# Patient Record
Sex: FEMALE | Race: WHITE
[De-identification: names, ages, dates, MRNs, and addresses within clinical notes are randomized per-mention and may not be internally consistent; named-entity substitution may affect disease eponyms.]

---

## 2021-03-05 ENCOUNTER — HOSPITAL ENCOUNTER (OUTPATIENT)
Dept: HOSPITAL 47 - RADECHMAIN | Age: 66
End: 2021-03-05
Attending: INTERNAL MEDICINE
Payer: MEDICARE

## 2021-03-05 DIAGNOSIS — I31.3: ICD-10-CM

## 2021-03-05 DIAGNOSIS — I08.1: ICD-10-CM

## 2021-03-05 DIAGNOSIS — I65.23: Primary | ICD-10-CM

## 2021-03-05 PROCEDURE — 93306 TTE W/DOPPLER COMPLETE: CPT

## 2021-03-05 PROCEDURE — 93880 EXTRACRANIAL BILAT STUDY: CPT

## 2021-03-05 NOTE — US
EXAMINATION TYPE: US carotid duplex BILAT

 

DATE OF EXAM: 3/5/2021

 

COMPARISON: NONE

 

CLINICAL HISTORY: I25.10 Atherosclerotic heart disease of native.

 

EXAM MEASUREMENTS: 

 

RIGHT:  Peak Systolic Velocity (PSV) cm/sec

----- Right CCA:  72.9  

----- Right ICA:  93.0     

----- Right ECA:  67.7   

ICA/CCA ratio:  1.3    

 

RIGHT:  End Diastole cm/sec

----- Right CCA:  20.6   

----- Right ICA:  27.0      

----- Right ECA:  0.0     

 

LEFT:  Peak Systolic Velocity (PSV) cm/sec

----- Left CCA:  94.1  

----- Left ICA:  88.6   

----- Left ECA:  62.5  

ICA/CCA ratio:  0.9  

 

LEFT:  End Diastole cm/sec

----- Left CCA:  20.4  

----- Left ICA:  18.8   

----- Left ECA:  0.0 

 

VERTEBRALS (direction of flow):

Right Vertebral: Antegrade

Left Vertebral: Antegrade

 

Rhythm:  Normal

 

No significant stenosis seen. No elevated velocities. Mild bilateral plaque.

 

 

 

IMPRESSION:  

1. Atheromatous plaquing with bilateral intimal thickening without significant flow-limiting stenosis
.   

 

 

Criteria for Assigning % of Stenosis / Diameter reduction

(Estimation based on the indirect measurements of the internal carotid artery velocities (ICA PSV).

1.  Normal (no stenosis)=ICA PSV < 125 cm/s: ratio < 2.0: ICA EDV<40 cm/s.

2. Less than 50% stenosis=ICA PSV < 125 cm/s: ratio < 2.0: ICA EDV<40 cm/s.

3.  50 to 69% stenosis=ICA PSV of 125 to 230 cm/s: ration 2.0 ? 4.0: ICA EDV  cm/s.

4.  Greater than 70% stenosis to near occlusion= ICA PSV > 230 cm/s: ratio > 4.0: ICA EDV > 100 cm/s.
 

5.  Near occlusion= ICA PSV velocities may be low or undetectable: variable ratio and ICA EDV.

6.  Total occlusion=unable to detect flow.

## 2021-03-06 NOTE — ECHOF
Referral Reason:I25.10



MEASUREMENTS

--------

HEIGHT: 165.1 cm

WEIGHT: 77.1 kg

BP: 

RVIDd:   2.6 cm     (< 3.3)

IVSd:   1.0 cm     (0.6 - 1.1)

LVIDd:   4.1 cm     (3.9 - 5.3)

LVPWd:   1.4 cm     (0.6 - 1.1)

IVSs:   1.7 cm

LVIDs:   3.2 cm

LVPWs:   1.6 cm

Ao Diam:   2.7 cm     (2.0 - 3.7)

AV Cusp:   1.5 cm     (1.5 - 2.6)

LA Diam:   3.2 cm     (2.7 - 3.8)

MV EXCURSION:   9.371 mm     (> 18.000)

MV EF SLOPE:   40 mm/s     (70 - 150)

EPSS:   0.7 cm

MV E Anuj:   0.70 m/s

MV DecT:   234 ms

MV A Anuj:   0.90 m/s

MV E/A Ratio:   0.78 

RAP:   5.00 mmHg

RVSP:   9.06 mmHg







FINDINGS

--------

This was a technically adequate study.

The left ventricular size is normal.   Left ventricular wall thickness is normal.   Overall left vent
ricular systolic function is low-normal with, an EF between 50 - 55 %.

The right ventricle is normal in size.

The left atrial size is normal.

The right atrial size is normal.

The aortic valve is trileaflet and appears structurally normal.

The mitral valve is normal.   There is trace mitral regurgitation.

The tricuspid valve appears structurally normal.   Trace tricuspid regurgitation present.   Right dustin
tricular systolic pressure is normal at < 35 mmHg.

There is no pulmonic regurgitation present.

The aortic root size is normal.

Normal inferior vena cava with normal inspiratory collapse consistent with estimated right atrial pre
ssure of  5 mmHg.

There is a small, generalized pericardial effusion present.



CONCLUSIONS

--------

1. The left ventricular size is normal.

2. Left ventricular wall thickness is normal.

3. Overall left ventricular systolic function is low-normal with, an EF between 50 - 55 %.

4. There is trace mitral regurgitation.

5. Trace tricuspid regurgitation present.

6. There is a small, generalized pericardial effusion present.





SONOGRAPHER: Ana Elder RDCS

## 2021-10-26 ENCOUNTER — HOSPITAL ENCOUNTER (OUTPATIENT)
Dept: HOSPITAL 47 - CPPFTMAIN | Age: 66
End: 2021-10-26
Attending: INTERNAL MEDICINE
Payer: MEDICARE

## 2021-10-26 DIAGNOSIS — R06.02: Primary | ICD-10-CM

## 2021-10-26 PROCEDURE — 94729 DIFFUSING CAPACITY: CPT

## 2021-10-26 PROCEDURE — 94060 EVALUATION OF WHEEZING: CPT

## 2021-10-26 PROCEDURE — 94726 PLETHYSMOGRAPHY LUNG VOLUMES: CPT

## 2022-08-12 ENCOUNTER — HOSPITAL ENCOUNTER (OUTPATIENT)
Dept: HOSPITAL 47 - RADMAMWWP | Age: 67
Discharge: HOME | End: 2022-08-12
Attending: FAMILY MEDICINE
Payer: MEDICARE

## 2022-08-12 DIAGNOSIS — M89.9: ICD-10-CM

## 2022-08-12 DIAGNOSIS — Z78.0: ICD-10-CM

## 2022-08-12 DIAGNOSIS — Z12.31: Primary | ICD-10-CM

## 2022-08-12 DIAGNOSIS — Z80.3: ICD-10-CM

## 2022-08-12 PROCEDURE — 77067 SCR MAMMO BI INCL CAD: CPT

## 2022-08-12 PROCEDURE — 77063 BREAST TOMOSYNTHESIS BI: CPT

## 2022-08-12 PROCEDURE — 77080 DXA BONE DENSITY AXIAL: CPT

## 2022-08-15 NOTE — BD
EXAMINATION TYPE: Axial Bone Density

 

DATE OF EXAM: 8/12/2022

 

COMPARISON: NONE

 

CLINICAL HISTORY: 66  year old Female.  ICD-10 CODE: N951 POST MENOPAUSAL SYMPTOMS, M899 DISORDER OF 
BONE

 

 

Height:  64

Weight:  165.8

 

FRAX RISK QUESTIONS:

Alcohol (3 or more units per day):  no

Family History (Parent hip fracture):  yes

Glucocorticoids (More than 3mos):  no

           (Ex: prednisone, prednisolone, methylprednisolone, dexamethasone, and hydrocortisone).    
     

History of Fracture in Adulthood: no

Secondary Osteoporosis:

  1.  Type 1 Diabetes: no

  2.  Hyperthyroidism: no

  3.  Menopause before 45: no

  4.  Malnutrition: no

  5.  Chronic liver disease: no

Rheumatoid Arthritis: no

Current Tobacco Use:no

 

RISK FACTORS 

HISTORY OF: 

Surgery to Spine/Hip(right/left)/Wrist (right/left): no

Family History of Osteoporosis: yes

Postmenopausal woman: yes

Lost more than 2 inches in height since high school: no

 

MEDICATIONS: 

 

 

Additional History:

 

 

EXAM MEASUREMENTS: 

Bone mineral densitometry was performed using the [employer] System.

Bone mineral density as measured about the Lumbar spine is:  

----- L1-L4(G/cm2): 1.503

T Score Values are as follows:

----- L1: 0.4

----- L2: 3.2

----- L3: 4.3

----- L4: 2.6

----- L1-L4: 2.7

Bone mineral density : baseline

 

Bone mineral density about the R hip (g/cm2): 0.980

Bone mineral density about the L hip (g/cm2): 0.994

T Score values are as follows:

-----R Neck: -0.4

-----L Neck: -0.3

-----R Total: 0.2

-----L Total: 0.7

Bone mineral density : baseline

 

 

FRAX%s: The graph provided illustrates a 12.8% chance for a major osteoporotic fx and a 0.7% chance f
or the hips probability for fx in 10 years time.

 

IMPRESSION:

Normal (Values between +1 and -1 indicate normal bone mass).  Consider repeating this study in 5 year
s or sooner if there is some new clinical indication.

 

NOTE:  T-SCORE=SD OF THE YOUNG ADULT MEAN.

## 2022-08-15 NOTE — MM
Reason for Exam: Screening  (asymptomatic). 





Patient History: 

Menarche at age 10. Patient has no children. Left ovary removed at age 41. Right ovary removed at

age 41. Hysterectomy at age 41. Postmenopausal.

Sister had breast cancer under age 50. 





Risk Values: 

[place] 5 year model risk: 3.6%.

NCI Lifetime model risk: 12.1%.





Tissue Density: 

There are scattered fibroglandular densities.





Findings: 

Analyzed By CAD. 

There is no suspicious group of microcalcifications or new suspicious mass in either breast. 





Overall Assessment: Negative, BI-RAD 1





Management: 

Screening Mammogram of both breasts in 1 year.

A clinical breast exam by your physician is recommended on an annual basis and results should be

correlated with mammographic findings.



Electronically signed and approved by: Ta Barrera D.O.

## 2023-04-04 ENCOUNTER — HOSPITAL ENCOUNTER (OUTPATIENT)
Dept: HOSPITAL 47 - LABWHC1 | Age: 68
Discharge: HOME | End: 2023-04-04
Attending: FAMILY MEDICINE
Payer: MEDICARE

## 2023-04-04 ENCOUNTER — HOSPITAL ENCOUNTER (OUTPATIENT)
Dept: HOSPITAL 47 - RADECHMAIN | Age: 68
Discharge: HOME | End: 2023-04-04
Attending: FAMILY MEDICINE
Payer: MEDICARE

## 2023-04-04 DIAGNOSIS — R07.82: Primary | ICD-10-CM

## 2023-04-04 DIAGNOSIS — I50.32: Primary | ICD-10-CM

## 2023-04-04 PROCEDURE — 93306 TTE W/DOPPLER COMPLETE: CPT

## 2023-04-04 NOTE — XR
EXAMINATION TYPE: XR ribs LT

 

DATE OF EXAM: 4/4/2023

 

CLINICAL HISTORY: Pain, Fall

 

Four views of the ribs fail demonstrate evidence for displaced rib fracture or secondary sign of rib 
fracture.  Visualized lungs are clear.  No evidence for pneumothorax. 

 

IMPRESSION: No displaced rib fractures seen.  ICD 10 NO FRACTURE, INITIAL EVALUATION

## 2023-04-05 NOTE — CA
Transthoracic Echo Report 

 Name: Justa Dan 

 MRN:    [id number] 

 Age:    67     Gender:     F 

 

 :    1955 

 Exam Date:     2023 14:30 

 Exam Location: [facility] 

 Ht (in):     65     Wt (lb):     170 

 Ordering Physician:        Cathy Braxton MD 

 Attending/Referring Phys: 

 Technician         Ronit Victoria RDCS 

 Procedure CPT: 

 Indications:       I50.32 CHRONIC DIASTOLIC (CONGESTIVE) HEART FAILUR 

 

 Cardiac Hx: 

 Technical Quality:      Fair 

 Contrast 1:                                Total Dose (mL): 

 Contrast 2:                                Total Dose (mL): 

 

 MEASUREMENTS  (Male / Female) Normal Values 

 2D ECHO 

 LV Diastolic Diameter PLAX        4.6 cm                4.2 - 5.9 / 3.9 - 5.3 cm 

 LV Systolic Diameter PLAX         2.8 cm                 

 IVS Diastolic Thickness           1.2 cm                0.6 - 1.0 / 0.6 - 0.9 cm 

 LVPW Diastolic Thickness          1.0 cm                0.6 - 1.0 / 0.6 - 0.9 cm 

 LV Relative Wall Thickness        0.5                    

 RV Internal Dim ED PLAX           2.8 cm                 

 LA Volume                         18.2 cm???              18 - 58 / 22 - 52 cm??? 

 

 M-MODE 

 Aortic Root Diameter MM           2.1 cm                 

 LA Systolic Diameter MM           3.7 cm                 

 LA Ao Ratio MM                    1.8                    

 AV Cusp Separation MM             2.0 cm                 

 

 DOPPLER 

 AV Peak Velocity                  120.0 cm/s             

 AV Peak Gradient                  5.8 mmHg               

 AV Mean Velocity                  80.3 cm/s              

 AV Mean Gradient                  2.9 mmHg               

 AV Velocity Time Integral         22.3 cm                

 LVOT Peak Velocity                91.5 cm/s              

 LVOT Peak Gradient                3.3 mmHg               

 LVOT Velocity Time Integral       19.6 cm                

 MV Area PHT                       2.4 cm???                

 Mitral E Point Velocity           71.1 cm/s              

 Mitral A Point Velocity           116.0 cm/s             

 Mitral E to A Ratio               0.6                    

 MV Deceleration Time              316.3 ms               

 MV E' Velocity                    6.0 cm/s               

 Mitral E to MV E' Ratio           11.8                   

 

 

 FINDINGS 

 Left Ventricle 

 Left ventricular cavity size normal. Mildly increased left ventricular wall  

 thickness. Normal left ventricular systolic function with no obvious regional  

 wall motion abnormalities. Left ventricular ejection fraction is estimated at  

 55-60 %. 

 

 Right Ventricle 

 Normal right ventricular size and function. Right ventricular systolic pressure  

 within normal limits. 

 

 Right Atrium 

 Normal right atrial size. 

 

 Left Atrium 

 Normal left atrial size. 

 

 Mitral Valve 

 Structurally normal mitral valve. No mitral stenosis, regurgitation or  

 prolapse. 

 

 Aortic Valve 

 No aortic valve stenosis or regurgitation. 

 

 Tricuspid Valve 

 Structurally normal tricuspid valve. Trace to mild tricuspid regurgitation. 

 

 Pulmonic Valve 

 Trace pulmonic regurgitation. 

 

 Pericardium 

 No pericardial effusion. 

 

 Aorta 

 Normal size aortic root and proximal ascending aorta. 

 

 CONCLUSIONS 

 Normal LV size and systolic function.  No significant abnormality in the  

 Doppler exam.  No pericardial effusion 

 Previewed by:  

 Dr. Elba Adamson MD 

 (Electronically Signed) 

 Final Date:      2023 09:53

## 2025-02-26 ENCOUNTER — HOSPITAL ENCOUNTER (OUTPATIENT)
Dept: HOSPITAL 47 - OR | Age: 70
Discharge: HOME | End: 2025-02-26
Payer: MEDICARE

## 2025-02-26 VITALS — TEMPERATURE: 97 F

## 2025-02-26 VITALS — RESPIRATION RATE: 18 BRPM | DIASTOLIC BLOOD PRESSURE: 64 MMHG | SYSTOLIC BLOOD PRESSURE: 107 MMHG | HEART RATE: 77 BPM

## 2025-02-26 VITALS — BODY MASS INDEX: 29.9 KG/M2

## 2025-02-26 DIAGNOSIS — K21.9: ICD-10-CM

## 2025-02-26 DIAGNOSIS — H25.11: Primary | ICD-10-CM

## 2025-02-26 DIAGNOSIS — Z96.1: ICD-10-CM

## 2025-02-26 DIAGNOSIS — H53.71: ICD-10-CM

## 2025-02-26 DIAGNOSIS — H52.223: ICD-10-CM

## 2025-02-26 DIAGNOSIS — H00.026: ICD-10-CM

## 2025-02-26 DIAGNOSIS — Z88.2: ICD-10-CM

## 2025-02-26 DIAGNOSIS — H52.03: ICD-10-CM

## 2025-02-26 DIAGNOSIS — H00.023: ICD-10-CM

## 2025-02-26 DIAGNOSIS — Z79.4: ICD-10-CM

## 2025-02-26 DIAGNOSIS — Z88.9: ICD-10-CM

## 2025-02-26 DIAGNOSIS — Z79.84: ICD-10-CM

## 2025-02-26 DIAGNOSIS — H52.4: ICD-10-CM

## 2025-02-26 DIAGNOSIS — H25.011: ICD-10-CM

## 2025-02-26 DIAGNOSIS — J45.909: ICD-10-CM

## 2025-02-26 DIAGNOSIS — Z88.8: ICD-10-CM

## 2025-02-26 DIAGNOSIS — E11.9: ICD-10-CM

## 2025-02-26 DIAGNOSIS — Z79.899: ICD-10-CM

## 2025-02-26 DIAGNOSIS — I25.10: ICD-10-CM

## 2025-02-26 LAB — GLUCOSE BLD-MCNC: 200 MG/DL (ref 70–110)

## 2025-02-26 PROCEDURE — 66984 XCAPSL CTRC RMVL W/O ECP: CPT

## 2025-02-26 RX ADMIN — CEFAZOLIN SODIUM ONE MLS: 10 INJECTION, POWDER, FOR SOLUTION INTRAVENOUS at 07:38

## 2025-02-26 RX ADMIN — ONDANSETRON STA MG: 2 INJECTION INTRAMUSCULAR; INTRAVENOUS at 07:20

## 2025-02-26 RX ADMIN — CEFAZOLIN ONE MLS: 330 INJECTION, POWDER, FOR SOLUTION INTRAMUSCULAR; INTRAVENOUS at 07:29

## 2025-02-26 RX ADMIN — POTASSIUM CHLORIDE ONE MLS: 14.9 INJECTION, SOLUTION INTRAVENOUS at 07:10

## 2025-02-26 RX ADMIN — CYCLOPENTOLATE HYDROCHLORIDE PRN DROPS: 10 SOLUTION/ DROPS OPHTHALMIC at 07:01

## 2025-02-26 RX ADMIN — PHENYLEPHRINE HYDROCHLORIDE PRN DROPS: 25 SOLUTION/ DROPS OPHTHALMIC at 07:05

## 2025-02-26 RX ADMIN — POTASSIUM CHLORIDE SCH MLS/HR: 14.9 INJECTION, SOLUTION INTRAVENOUS at 07:10

## 2025-02-26 RX ADMIN — FAMOTIDINE STA MG: 10 INJECTION, SOLUTION INTRAVENOUS at 07:20

## 2025-02-26 RX ADMIN — LIDOCAINE HYDROCHLORIDE ONE ML: 10 INJECTION, SOLUTION EPIDURAL; INFILTRATION; INTRACAUDAL; PERINEURAL at 07:39

## 2025-02-26 RX ADMIN — DEXAMETHASONE SODIUM PHOSPHATE STA MG: 4 INJECTION, SOLUTION INTRAMUSCULAR; INTRAVENOUS at 07:20

## 2025-02-26 RX ADMIN — BALANCED SALT SOLUTION ONE ML: 6.4; .75; .48; .3; 3.9; 1.7 SOLUTION OPHTHALMIC at 07:39

## 2025-02-26 RX ADMIN — Medication ONE EACH: at 07:39

## 2025-02-26 RX ADMIN — MOXIFLOXACIN PRN DROPS: 5 SOLUTION/ DROPS OPHTHALMIC at 07:39

## 2025-02-26 RX ADMIN — TIMOLOL MALEATE PRN DROPS: 5 SOLUTION OPHTHALMIC at 07:39

## 2025-02-26 NOTE — OP
OPERATIVE REPORT



DATE OF SERVICE : 02/26/2025



PREOPERATIVE DIAGNOSES:

Nuclear sclerosis, cortical sclerosis.



POSTOPERATIVE DIAGNOSES:

Nuclear sclerosis, cortical sclerosis.



OPERATION:

Phacoemulsification of cataract and intraocular lens implant of the right eye.



ESTIMATED BLOOD LOSS:

Zero.



SPECIMEN TAKEN:

None.



NARRATIVE:

After obtaining the appropriate consent, the patient was brought to the operating room

where the patient was placed under cardiac monitoring and prepped and draped in the

usual sterile manner.  At the 11 o'clock position, a 15-degree super sharp blade was

used to create a paracentesis followed by instillation of 1% Xylocaine MPF 50:50 mix

with BSS into the anterior chamber.  This was followed by Amvisc viscoelastic to

stabilize the anterior chamber.  At the 9 o'clock position a self-sealing corneal flap

incision was created using 2.8 mm keiry keratome.  A cystotome was used to initiate a

continuous tear capsulorrhexis which was completed with the Utrata forceps.  A

Binkhorst cannula was used to hydrodissect the lens nucleus followed by

hydrodelineation.  Phacoemulsification of the lens was performed utilizing phacochop in

13.64 seconds at 18.5% power.  The remaining cortical material was removed using the

irrigation aspiration mode followed by additional 1% Xylocaine MPF into the anterior

chamber followed by viscoelastic to stabilize the capsular bag.  A [employer],

model DCB00, 22.0 diopters posterior chamber lens was placed into the capsular bag

without difficulty.  The remaining viscoelastic material was removed from the anterior

chamber with the irrigation/aspiration.  Balanced salt solution was used to normalize

the intraocular pressure.  The incision was checked for watertight integrity.  The

patient then received 2 drops of 0.5% timolol followed by 2 drops Vigamox, was lightly

patched and shielded in the usual manner.  There were no complications from the

procedure.  The patient tolerated the procedure well and was returned to recovery in

good condition.





MMODL / IJN: [id number] / Job#: [id number]

## 2025-02-26 NOTE — P.OP
Date of Procedure: 02/26/25


Preoperative Diagnosis: 


NS & cS


Postoperative Diagnosis: 


same


Procedure(s) Performed: 


PIOL, OD


Implants: 


BCB00 22.00


Anesthesia: MAC


Surgeon: Kalyan Reyes


Pathology: none sent


Condition: stable


Disposition: same day


Indications for Procedure: 


blurry vision


Operative Findings: 


no complications